# Patient Record
Sex: MALE | Race: BLACK OR AFRICAN AMERICAN | Employment: FULL TIME | URBAN - METROPOLITAN AREA
[De-identification: names, ages, dates, MRNs, and addresses within clinical notes are randomized per-mention and may not be internally consistent; named-entity substitution may affect disease eponyms.]

---

## 2024-08-12 ENCOUNTER — HOSPITAL ENCOUNTER (EMERGENCY)
Facility: HOSPITAL | Age: 39
Discharge: HOME | End: 2024-08-12
Attending: EMERGENCY MEDICINE
Payer: COMMERCIAL

## 2024-08-12 ENCOUNTER — APPOINTMENT (EMERGENCY)
Dept: CT IMAGING | Facility: HOSPITAL | Age: 39
End: 2024-08-12
Payer: COMMERCIAL

## 2024-08-12 VITALS
OXYGEN SATURATION: 96 % | HEART RATE: 65 BPM | DIASTOLIC BLOOD PRESSURE: 76 MMHG | SYSTOLIC BLOOD PRESSURE: 130 MMHG | WEIGHT: 190.7 LBS | TEMPERATURE: 98.4 F | RESPIRATION RATE: 16 BRPM

## 2024-08-12 DIAGNOSIS — N20.0 RENAL CALCULI: ICD-10-CM

## 2024-08-12 DIAGNOSIS — Z80.0 FAMILY HISTORY OF STOMACH CANCER: ICD-10-CM

## 2024-08-12 DIAGNOSIS — R10.9 ABDOMINAL PAIN: Primary | ICD-10-CM

## 2024-08-12 DIAGNOSIS — R91.8 PULMONARY NODULES: ICD-10-CM

## 2024-08-12 DIAGNOSIS — R59.0 MESENTERIC LYMPHADENOPATHY: ICD-10-CM

## 2024-08-12 LAB
ALBUMIN SERPL BCG-MCNC: 4.5 G/DL (ref 3.5–5)
ALP SERPL-CCNC: 87 U/L (ref 34–104)
ALT SERPL W P-5'-P-CCNC: 33 U/L (ref 7–52)
ANION GAP SERPL CALCULATED.3IONS-SCNC: 6 MMOL/L (ref 4–13)
AST SERPL W P-5'-P-CCNC: 18 U/L (ref 13–39)
BASOPHILS # BLD AUTO: 0.03 THOUSANDS/ÂΜL (ref 0–0.1)
BASOPHILS NFR BLD AUTO: 0 % (ref 0–1)
BILIRUB SERPL-MCNC: 0.5 MG/DL (ref 0.2–1)
BUN SERPL-MCNC: 11 MG/DL (ref 5–25)
CALCIUM SERPL-MCNC: 9.4 MG/DL (ref 8.4–10.2)
CHLORIDE SERPL-SCNC: 103 MMOL/L (ref 96–108)
CO2 SERPL-SCNC: 28 MMOL/L (ref 21–32)
CREAT SERPL-MCNC: 0.88 MG/DL (ref 0.6–1.3)
EOSINOPHIL # BLD AUTO: 0.11 THOUSAND/ÂΜL (ref 0–0.61)
EOSINOPHIL NFR BLD AUTO: 1 % (ref 0–6)
ERYTHROCYTE [DISTWIDTH] IN BLOOD BY AUTOMATED COUNT: 13 % (ref 11.6–15.1)
GFR SERPL CREATININE-BSD FRML MDRD: 107 ML/MIN/1.73SQ M
GLUCOSE SERPL-MCNC: 83 MG/DL (ref 65–140)
HCT VFR BLD AUTO: 46.6 % (ref 36.5–49.3)
HGB BLD-MCNC: 16.4 G/DL (ref 12–17)
IMM GRANULOCYTES # BLD AUTO: 0.02 THOUSAND/UL (ref 0–0.2)
IMM GRANULOCYTES NFR BLD AUTO: 0 % (ref 0–2)
LIPASE SERPL-CCNC: 35 U/L (ref 11–82)
LYMPHOCYTES # BLD AUTO: 2.29 THOUSANDS/ÂΜL (ref 0.6–4.47)
LYMPHOCYTES NFR BLD AUTO: 27 % (ref 14–44)
MCH RBC QN AUTO: 30.6 PG (ref 26.8–34.3)
MCHC RBC AUTO-ENTMCNC: 35.2 G/DL (ref 31.4–37.4)
MCV RBC AUTO: 87 FL (ref 82–98)
MONOCYTES # BLD AUTO: 0.41 THOUSAND/ÂΜL (ref 0.17–1.22)
MONOCYTES NFR BLD AUTO: 5 % (ref 4–12)
NEUTROPHILS # BLD AUTO: 5.71 THOUSANDS/ÂΜL (ref 1.85–7.62)
NEUTS SEG NFR BLD AUTO: 67 % (ref 43–75)
NRBC BLD AUTO-RTO: 0 /100 WBCS
PLATELET # BLD AUTO: 263 THOUSANDS/UL (ref 149–390)
PMV BLD AUTO: 10.1 FL (ref 8.9–12.7)
POTASSIUM SERPL-SCNC: 3.6 MMOL/L (ref 3.5–5.3)
PROT SERPL-MCNC: 7.8 G/DL (ref 6.4–8.4)
RBC # BLD AUTO: 5.36 MILLION/UL (ref 3.88–5.62)
SODIUM SERPL-SCNC: 137 MMOL/L (ref 135–147)
WBC # BLD AUTO: 8.57 THOUSAND/UL (ref 4.31–10.16)

## 2024-08-12 PROCEDURE — 96374 THER/PROPH/DIAG INJ IV PUSH: CPT

## 2024-08-12 PROCEDURE — 83690 ASSAY OF LIPASE: CPT | Performed by: EMERGENCY MEDICINE

## 2024-08-12 PROCEDURE — 36415 COLL VENOUS BLD VENIPUNCTURE: CPT | Performed by: EMERGENCY MEDICINE

## 2024-08-12 PROCEDURE — 74177 CT ABD & PELVIS W/CONTRAST: CPT

## 2024-08-12 PROCEDURE — 99284 EMERGENCY DEPT VISIT MOD MDM: CPT

## 2024-08-12 PROCEDURE — 99285 EMERGENCY DEPT VISIT HI MDM: CPT | Performed by: EMERGENCY MEDICINE

## 2024-08-12 PROCEDURE — 85025 COMPLETE CBC W/AUTO DIFF WBC: CPT | Performed by: EMERGENCY MEDICINE

## 2024-08-12 PROCEDURE — 80053 COMPREHEN METABOLIC PANEL: CPT | Performed by: EMERGENCY MEDICINE

## 2024-08-12 PROCEDURE — 96375 TX/PRO/DX INJ NEW DRUG ADDON: CPT

## 2024-08-12 RX ORDER — SUCRALFATE 1 G/1
1 TABLET ORAL
Qty: 120 TABLET | Refills: 0 | Status: SHIPPED | OUTPATIENT
Start: 2024-08-12 | End: 2024-09-11

## 2024-08-12 RX ORDER — SUCRALFATE 1 G/1
1 TABLET ORAL ONCE
Status: COMPLETED | OUTPATIENT
Start: 2024-08-12 | End: 2024-08-12

## 2024-08-12 RX ORDER — ONDANSETRON 2 MG/ML
4 INJECTION INTRAMUSCULAR; INTRAVENOUS ONCE
Status: COMPLETED | OUTPATIENT
Start: 2024-08-12 | End: 2024-08-12

## 2024-08-12 RX ORDER — PANTOPRAZOLE SODIUM 40 MG/10ML
40 INJECTION, POWDER, LYOPHILIZED, FOR SOLUTION INTRAVENOUS ONCE
Status: COMPLETED | OUTPATIENT
Start: 2024-08-12 | End: 2024-08-12

## 2024-08-12 RX ORDER — PANTOPRAZOLE SODIUM 20 MG/1
20 TABLET, DELAYED RELEASE ORAL DAILY
Qty: 30 TABLET | Refills: 0 | Status: SHIPPED | OUTPATIENT
Start: 2024-08-12 | End: 2024-09-11

## 2024-08-12 RX ORDER — ONDANSETRON 4 MG/1
4 TABLET, ORALLY DISINTEGRATING ORAL EVERY 6 HOURS PRN
Qty: 10 TABLET | Refills: 0 | Status: SHIPPED | OUTPATIENT
Start: 2024-08-12

## 2024-08-12 RX ADMIN — SUCRALFATE 1 G: 1 TABLET ORAL at 14:17

## 2024-08-12 RX ADMIN — IOHEXOL 100 ML: 350 INJECTION, SOLUTION INTRAVENOUS at 15:34

## 2024-08-12 RX ADMIN — PANTOPRAZOLE SODIUM 40 MG: 40 INJECTION, POWDER, FOR SOLUTION INTRAVENOUS at 14:23

## 2024-08-12 RX ADMIN — ONDANSETRON 4 MG: 2 INJECTION INTRAMUSCULAR; INTRAVENOUS at 14:19

## 2024-08-12 NOTE — Clinical Note
Virgil Christy was seen and treated in our emergency department on 8/12/2024.                Diagnosis:     Virgil  may return to work on return date.    He may return on this date: 08/12/2024    Virgil arrived to the ED at 1 pm on 8/12/24     If you have any questions or concerns, please don't hesitate to call.      Kiley Ackerman, DO    ______________________________           _______________          _______________  Hospital Representative                              Date                                Time

## 2024-08-12 NOTE — ED PROVIDER NOTES
History  Chief Complaint   Patient presents with    Abdominal Pain     Abdominal pain & itching inside abdomen since last night, blood in stool today.  Concerned because both parents  of stomach cancer     A 39-year-old male with no significant past medical history; presents with generalized abdominal pain.  Patient states pain has been present for at least the past 15 years, although has acutely worsened.  Pain is generalized.  He complains of associated nausea but denies vomiting.  He also reports intermittent diarrhea with blood in the stool.  He has been unable to identify exacerbating factors, although has modified his diet.  Patient states he was recently managed by a physician in [place], and was treated with omeprazole.  He has not had a colonoscopy, endoscopy or imaging.  Patient otherwise denies fever, chills, chest pain, shortness of breath, peripheral edema and rashes.  Patient does report that both his parents  of stomach cancer.      History provided by:  Patient  Abdominal Pain  Associated symptoms: diarrhea and nausea        None       Past Medical History:   Diagnosis Date    Prediabetes        History reviewed. No pertinent surgical history.    History reviewed. No pertinent family history.  I have reviewed and agree with the history as documented.    E-Cigarette/Vaping    E-Cigarette Use Never User      E-Cigarette/Vaping Substances     Social History     Tobacco Use    Smoking status: Every Day     Types: Cigarettes     Passive exposure: Never    Smokeless tobacco: Never   Vaping Use    Vaping status: Never Used   Substance Use Topics    Alcohol use: Not Currently    Drug use: Yes     Types: Marijuana       Review of Systems   Gastrointestinal:  Positive for abdominal pain, diarrhea and nausea.   All other systems reviewed and are negative.      Physical Exam  Physical Exam  General Appearance: alert and oriented, nad, non toxic appearing  Skin:  Warm, dry, intact.  No cyanosis  HEENT:  Atraumatic, normocephalic.  No eye drainage.  Normal hearing.  Moist mucous membranes.    Neck: Supple, trachea midline  Cardiac: RRR; no murmurs, rub, gallops.  No pedal edema, 2+ pulses  Pulmonary: lungs CTAB; no wheezes, rales, rhonchi  Gastrointestinal: abdomen soft, generalized abdominal tenderness, nondistended; no guarding or rebound tenderness; good bowel sounds, no mass or bruits  Extremities:  No deformities.  No calf tenderness, no clubbing  Neuro:  no focal motor or sensory deficits, CN 2-12 grossly intact  Psych:  Normal mood and affect, normal judgement and insight      Vital Signs  ED Triage Vitals   Temperature Pulse Respirations Blood Pressure SpO2   08/12/24 1542 08/12/24 1318 08/12/24 1318 08/12/24 1318 08/12/24 1318   98.4 °F (36.9 °C) 82 16 155/74 98 %      Temp Source Heart Rate Source Patient Position - Orthostatic VS BP Location FiO2 (%)   08/12/24 1542 08/12/24 1700 08/12/24 1700 08/12/24 1700 --   Oral Monitor Sitting Right arm       Pain Score       08/12/24 1318       7           Vitals:    08/12/24 1318 08/12/24 1430 08/12/24 1500 08/12/24 1700   BP: 155/74 130/70 125/68 130/76   Pulse: 82 63 66 65   Patient Position - Orthostatic VS:    Sitting         Visual Acuity      ED Medications  Medications   ondansetron (ZOFRAN) injection 4 mg (4 mg Intravenous Given 8/12/24 1419)   pantoprazole (PROTONIX) injection 40 mg (40 mg Intravenous Given 8/12/24 1423)   sucralfate (CARAFATE) tablet 1 g (1 g Oral Given 8/12/24 1417)   iohexol (OMNIPAQUE) 350 MG/ML injection (SINGLE-DOSE) 100 mL (100 mL Intravenous Given 8/12/24 1534)       Diagnostic Studies  Results Reviewed       Procedure Component Value Units Date/Time    Comprehensive metabolic panel [[id number]] Resulted: 08/12/24 1451    Lab Status: Final result Specimen: Blood Updated: 08/12/24 1451     Sodium 137 mmol/L      Potassium 3.6 mmol/L      Chloride 103 mmol/L      CO2 28 mmol/L      ANION GAP 6 mmol/L      BUN 11 mg/dL      Creatinine  0.88 mg/dL      Glucose 83 mg/dL      Calcium 9.4 mg/dL      AST 18 U/L      ALT 33 U/L      Alkaline Phosphatase 87 U/L      Total Protein 7.8 g/dL      Albumin 4.5 g/dL      Total Bilirubin 0.50 mg/dL      eGFR 107 ml/min/1.73sq m     Narrative:      National Kid[employer] Foundation guidelines for Chronic Kidney Disease (CKD):     Stage 1 with normal or high GFR (GFR > 90 mL/min/1.73 square meters)    Stage 2 Mild CKD (GFR = 60-89 mL/min/1.73 square meters)    Stage 3A Moderate CKD (GFR = 45-59 mL/min/1.73 square meters)    Stage 3B Moderate CKD (GFR = 30-44 mL/min/1.73 square meters)    Stage 4 Severe CKD (GFR = 15-29 mL/min/1.73 square meters)    Stage 5 End Stage CKD (GFR <15 mL/min/1.73 square meters)  Note: GFR calculation is accurate only with a steady state creatinine    Lipase [[id number]]  (Normal) Resulted: 08/12/24 1451    Lab Status: Final result Specimen: Blood Updated: 08/12/24 1451     Lipase 35 u/L     CBC and differential [[id number]] Collected: 08/12/24 1438    Lab Status: Final result Specimen: Blood Updated: 08/12/24 1438     WBC 8.57 Thousand/uL      RBC 5.36 Million/uL      Hemoglobin 16.4 g/dL      Hematocrit 46.6 %      MCV 87 fL      MCH 30.6 pg      MCHC 35.2 g/dL      RDW 13.0 %      MPV 10.1 fL      Platelets 263 Thousands/uL      nRBC 0 /100 WBCs      Segmented % 67 %      Immature Grans % 0 %      Lymphocytes % 27 %      Monocytes % 5 %      Eosinophils Relative 1 %      Basophils Relative 0 %      Absolute Neutrophils 5.71 Thousands/µL      Absolute Immature Grans 0.02 Thousand/uL      Absolute Lymphocytes 2.29 Thousands/µL      Absolute Monocytes 0.41 Thousand/µL      Eosinophils Absolute 0.11 Thousand/µL      Basophils Absolute 0.03 Thousands/µL     Narrative:      This is an appended report.  These results have been appended to a previously verified report.                   CT abdomen pelvis with contrast   Final Result by Yessica Lima MD (08/12 1622)      A few  groundglass left lower lobe pulmonary nodules measuring up to 5 mm, favored to be postinfectious or postinflammatory in origin. There is associated bronchiectasis and scarring. No prior cross-sectional imaging of the chest is available for direct    comparison. Nonemergent CT chest without contrast is recommended for complete assessment      Bilateral nonobstructing renal calculi measuring up to 3 mm.      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 In absence of prior CT examination for comparison, a follow-up CT abdomen pelvis in 6 months is recommended to assess for    stability and to exclude less likely possibility of developing mesenteric lymphoma.      The study was marked in EPIC for immediate notification.         Workstation performed: [id number]                    Procedures  Procedures         ED Course  ED Course as of 08/12/24 2204   Mon Aug 12, 2024   1456 Labs WNL   1634 CT abdomen pelvis with contrast  1.) A few groundglass left lower lobe pulmonary nodules measuring up to 5 mm, favored to be postinfectious or postinflammatory in origin. There is associated bronchiectasis and scarring. No prior cross-sectional imaging of the chest is available for direct comparison. Nonemergent CT chest without contrast is recommended for complete assessment  2.) Bilateral nonobstructing renal calculi measuring up to 3 mm.  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 In absence of prior CT examination for comparison, a follow-up CT abdomen pelvis in 6 months is recommended to assess for stability and to exclude less likely possibility of developing mesenteric lymphoma.   1639 Pt reports to feeling better with medications.  Pt updated on CT and lab findings.  He has a local PCP whom he will follow up with for the pulmonary nodules and CT Chest.   Will place referral for GI to further discuss EGD/colonoscopy and monitoring of lymphadenopathy, pt in agreement.                                   SBIRT 20yo+      Flowsheet Row Most Recent Value   Initial Alcohol Screen: US AUDIT-C     1. How often do you have a drink containing alcohol? 0 Filed at: 08/12/2024 1428   2. How many drinks containing alcohol do you have on a typical day you are drinking?  0 Filed at: 08/12/2024 1428   3a. Male UNDER 65: How often do you have five or more drinks on one occasion? 0 Filed at: 08/12/2024 1428   3b. FEMALE Any Age, or MALE 65+: How often do you have 4 or more drinks on one occassion? 0 Filed at: 08/12/2024 1428   Audit-C Score 0 Filed at: 08/12/2024 1428   LAST: How many times in the past year have you...    Used an illegal drug or used a prescription medication for non-medical reasons? Never Filed at: 08/12/2024 1428                      Medical Decision Making  A 40 yo male presents with generalized abdominal pain, nausea and diarrhea - acute on chronic.  No medical records available for review, although pt reports he has not had EGD, colonoscopy and imaging.  Strong family history of GI malignancy.  Will check labs to evaluate for electrolyte abnormality, SAGAR, anemia, significant leukocytosis, pancreatitis, hepatitis and biliary etiology.  Will check CTAP to evaluate for intra-abdominal pathology.  Will treat symptomatically.    Amount and/or Complexity of Data Reviewed  Labs: ordered.  Radiology: ordered. Decision-making details documented in ED Course.    Risk  Prescription drug management.                 Disposition  Final diagnoses:   Abdominal pain   Mesenteric lymphadenopathy   Pulmonary nodules   Renal calculi   Family history of stomach cancer     Time reflects when diagnosis was documented in both MDM as applicable and the Disposition within this note       Time User Action Codes Description Comment    8/12/2024  4:50 PM Kiley Ackerman Add [R10.9] Abdominal  pain     8/12/2024  4:51 PM Kiley Ackerman Add [R59.0] Mesenteric lymphadenopathy     8/12/2024  4:51 PM Kiley Ackerman Add [R91.8] Pulmonary nodules     8/12/2024  4:51 PM Tyron Kiley Add [N20.0] Renal calculi     8/12/2024  4:52 PM Kiley Ackerman Add [Z80.0] Family history of stomach cancer           ED Disposition       ED Disposition   Discharge    Condition   Stable    Date/Time   Mon Aug 12, 2024 1650    Comment   Virgil Christy discharge to home/self care.                   Follow-up Information       Follow up With Specialties Details Why Contact Info Additional Information    [facility] Schedule an appointment as soon as possible for a visit  For re-evaluation and to discuss CT Chest [address] [facility] [id number] [phone]       [facility] Schedule an appointment as soon as possible for a visit  For re-evaluation [address]  [facility] [address] [phone] [facility] [place], [facility] [address], [place], [place], [address] [phone]            Discharge Medication List as of 8/12/2024  4:56 PM        START taking these medications    Details   ondansetron (ZOFRAN-ODT) 4 mg disintegrating tablet Take 1 tablet (4 mg total) by mouth every 6 (six) hours as needed for nausea or vomiting, Starting Mon 8/12/2024, Normal      pantoprazole (PROTONIX) 20 mg tablet Take 1 tablet (20 mg total) by mouth daily, Starting Mon 8/12/2024, Until Wed 9/11/2024, Normal      sucralfate (CARAFATE) 1 g tablet Take 1 tablet (1 g total) by mouth 4 (four) times a day (before meals and at bedtime), Starting Mon 8/12/2024, Until Wed 9/11/2024, Normal                 PDMP Review       None            ED Provider  Electronically Signed by             Kiley Ackerman DO  08/12/24 2586

## 2024-08-12 NOTE — INCIDENTAL FINDINGS
The following findings require follow up:  Radiographic finding   Finding: Central mesenteric fat stranding and prominent lymph nodes   Follow up required: CT abdomen pelvis in 6 months is recommended to assess for stability   Follow up should be done within 6 month(s).  Sooner given family history    Please notify the following clinician to assist with the follow up:   Gastroenterology - referral placed    Incidental finding results were discussed with the Patient by Kiley Ackerman DO on 08/12/24.   They expressed understanding and all questions answered.

## 2024-08-12 NOTE — INCIDENTAL FINDINGS
The following findings require follow up:  Radiographic finding   Finding: A few groundglass left lower lobe pulmonary nodules measuring up to 5 mm, favored to be postinfectious or postinflammatory in origin. There is associated bronchiectasis and scarring.    Follow up required: CT chest without contrast   Follow up should be done within nonemergent    Please notify the following clinician to assist with the follow up:   [facility]    Incidental finding results were discussed with the Patient by Kiley Ackerman DO on 08/12/24.   They expressed understanding and all questions answered.